# Patient Record
Sex: MALE | Race: WHITE
[De-identification: names, ages, dates, MRNs, and addresses within clinical notes are randomized per-mention and may not be internally consistent; named-entity substitution may affect disease eponyms.]

---

## 2021-04-19 ENCOUNTER — HOSPITAL ENCOUNTER (EMERGENCY)
Dept: HOSPITAL 53 - M ED | Age: 26
LOS: 1 days | Discharge: HOME | End: 2021-04-20
Payer: COMMERCIAL

## 2021-04-19 VITALS — BODY MASS INDEX: 22.96 KG/M2 | HEIGHT: 72 IN | WEIGHT: 169.54 LBS

## 2021-04-19 DIAGNOSIS — H00.022: ICD-10-CM

## 2021-04-19 DIAGNOSIS — H00.012: Primary | ICD-10-CM

## 2021-04-20 VITALS — DIASTOLIC BLOOD PRESSURE: 74 MMHG | SYSTOLIC BLOOD PRESSURE: 125 MMHG

## 2021-06-10 ENCOUNTER — HOSPITAL ENCOUNTER (EMERGENCY)
Dept: HOSPITAL 53 - M ED | Age: 26
Discharge: HOME | End: 2021-06-10
Payer: COMMERCIAL

## 2021-06-10 VITALS — BODY MASS INDEX: 23.71 KG/M2 | HEIGHT: 72 IN | WEIGHT: 175.05 LBS

## 2021-06-10 VITALS — SYSTOLIC BLOOD PRESSURE: 133 MMHG | DIASTOLIC BLOOD PRESSURE: 71 MMHG

## 2021-06-10 DIAGNOSIS — R10.9: Primary | ICD-10-CM

## 2021-06-10 LAB
ALBUMIN SERPL BCG-MCNC: 4 GM/DL (ref 3.2–5.2)
ALT SERPL W P-5'-P-CCNC: 28 U/L (ref 12–78)
BILIRUB CONJ SERPL-MCNC: 0.2 MG/DL (ref 0–0.2)
BILIRUB SERPL-MCNC: 1.1 MG/DL (ref 0.2–1)
LIPASE SERPL-CCNC: 67 U/L (ref 73–393)
PROT SERPL-MCNC: 7.5 GM/DL (ref 6.4–8.2)

## 2021-06-10 NOTE — REP
INDICATION:

right FP ?stone.



COMPARISON:

None.



TECHNIQUE:

Noncontrast enhanced stone protocol due to right flank pain



FINDINGS:

Lung bases are clear.



Limited evaluation of the solid intra-abdominal organs and gallbladder show no gross

abnormalities.  Limited evaluation of the pancreas, adrenal glands, and kidneys show

no gross abnormalities.  There is no nephroureterolithiasis, hydronephrosis, or

hydroureter.  There are no urinary bladder calcifications.  Limited evaluation of the

abdominal aorta and para-aortic regions show no gross abnormalities.  Limited

evaluation of the bowel loops and the mesenteries show no gross abnormalities.  There

is no free fluid or free air.  The osseous structures are within normal limits.



IMPRESSION:

Negative noncontrast CT examination of the abdomen and pelvis as described above.





<Electronically signed by Bill Pierce > 06/10/21 [id number]